# Patient Record
(demographics unavailable — no encounter records)

---

## 2025-02-12 NOTE — REVIEW OF SYSTEMS
[Nosebleeds] : nosebleeds [Insomnia] : insomnia [Anxiety] : anxiety [Negative] : Heme/Lymph [Suicidal] : not suicidal [Depression] : no depression

## 2025-02-12 NOTE — PHYSICAL EXAM
[No Acute Distress] : no acute distress [Well Nourished] : well nourished [Well Developed] : well developed [Well-Appearing] : well-appearing [Normal Voice/Communication] : normal voice/communication [Supple] : supple [No Respiratory Distress] : no respiratory distress  [No Accessory Muscle Use] : no accessory muscle use [Clear to Auscultation] : lungs were clear to auscultation bilaterally [Normal Rate] : normal rate  [Regular Rhythm] : with a regular rhythm [Normal S1, S2] : normal S1 and S2 [No Carotid Bruits] : no carotid bruits [Normal Mood] : the mood was normal

## 2025-02-12 NOTE — HISTORY OF PRESENT ILLNESS
[FreeTextEntry1] : pt presents for med follow up  [de-identified] : For the past few months, he has had nosebleeds from the right nostril on and off.  He hasn't scheduled the urology appointment or the carotid artery US yet.  His cousin was just diagnosed with a blockage in his carotids and his father and uncle also had blockages. He's been working full time again and work has been busy.  He has a strong family history of Factor V deficiency. He would like to renew alprazolam that he takes on occasion for his anxiety.

## 2025-02-12 NOTE — PLAN
[FreeTextEntry1] : The hypertension is stable and he will continue taking Telmisartan 80 mg daily. For the stable anxiety I checked I-STOP and renewed alprazolam 0.25 mg bid prn. Fasting labs were ordered to further assess his health and to follow up on the elevated PSA and to rule out Factor V Leiden.  He will schedule the carotid artery US.